# Patient Record
Sex: FEMALE | Race: OTHER | HISPANIC OR LATINO
[De-identification: names, ages, dates, MRNs, and addresses within clinical notes are randomized per-mention and may not be internally consistent; named-entity substitution may affect disease eponyms.]

---

## 2021-12-23 PROBLEM — Z00.00 ENCOUNTER FOR PREVENTIVE HEALTH EXAMINATION: Status: ACTIVE | Noted: 2021-12-23

## 2021-12-28 ENCOUNTER — APPOINTMENT (OUTPATIENT)
Dept: SURGERY | Facility: CLINIC | Age: 51
End: 2021-12-28
Payer: COMMERCIAL

## 2021-12-28 DIAGNOSIS — K63.89 OTHER SPECIFIED DISEASES OF INTESTINE: ICD-10-CM

## 2021-12-28 DIAGNOSIS — I10 ESSENTIAL (PRIMARY) HYPERTENSION: ICD-10-CM

## 2021-12-28 PROCEDURE — 99204 OFFICE O/P NEW MOD 45 MIN: CPT

## 2021-12-28 RX ORDER — METRONIDAZOLE 500 MG/1
500 TABLET ORAL
Qty: 6 | Refills: 0 | Status: ACTIVE | COMMUNITY
Start: 2021-12-28 | End: 1900-01-01

## 2021-12-28 RX ORDER — OXYCODONE 5 MG/1
5 TABLET ORAL
Qty: 20 | Refills: 0 | Status: ACTIVE | COMMUNITY
Start: 2021-09-15

## 2021-12-28 RX ORDER — AMLODIPINE BESYLATE AND BENAZEPRIL HYDROCHLORIDE 5; 40 MG/1; MG/1
5-40 CAPSULE ORAL
Qty: 90 | Refills: 0 | Status: ACTIVE | COMMUNITY
Start: 2021-04-23

## 2021-12-28 RX ORDER — SODIUM SULFATE, POTASSIUM SULFATE, MAGNESIUM SULFATE 17.5; 3.13; 1.6 G/ML; G/ML; G/ML
17.5-3.13-1.6 SOLUTION, CONCENTRATE ORAL
Qty: 354 | Refills: 0 | Status: ACTIVE | COMMUNITY
Start: 2021-10-05

## 2021-12-28 RX ORDER — NEOMYCIN SULFATE 500 MG/1
500 TABLET ORAL
Qty: 6 | Refills: 0 | Status: ACTIVE | COMMUNITY
Start: 2021-12-28 | End: 1900-01-01

## 2022-01-10 NOTE — PHYSICAL EXAM
[Abdomen Masses] : No abdominal masses [Abdomen Tenderness] : ~T No ~M abdominal tenderness [JVD] : no jugular venous distention  [Respiratory Effort] : normal respiratory effort [No Rash or Lesion] : No rash or lesion [Alert] : alert [Calm] : calm [de-identified] : No acute distress

## 2022-01-10 NOTE — HISTORY OF PRESENT ILLNESS
[FreeTextEntry1] : 51-year-old female here for initial evaluation referred by Dr. Bhandari for a newly diagnosed colonic mass on colonoscopy.  Patient had a positive Cologuard prior to colonoscopy.  A large friable mass in the ascending colon that was biopsied.  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  Patient has not yet had a CT abdomen pelvis to assess for any metastatic disease.\par \par She is otherwise quite healthy.  She has a history of hypertension and chronic constipation.

## 2022-01-10 NOTE — ASSESSMENT
[FreeTextEntry1] : 51-year-old female with a large ascending colon mass with at least high-grade dysplasia on biopsy.  The mass is not resectable via an endoscopic approach.  Significant risk for underlying cancer in such a large polypoid lesion.\par \par Discussed surgical treatment of unresectable polyps and colon cancer with patient.  We will get CT imaging to rule out any distant metastatic disease.  Discussed open laparoscopic and robotic approaches to colon resection.  We will plan for a robotic assisted right hemicolectomy with an oncologic resection given the high risk for underlying cancer.\par \par Risks and benefits discussed including risks of open surgery infection anastomotic leak bleeding as well as DVT PE.  Patient will schedule her CT scan and then plan for surgery.\par \par Bowel prep instructions provided as well this surgical pre and post information packet.\par \par Patient can have clearance with her primary care physician or the nurse practitioner in the presurgical testing suite.\par \par She will get Covid testing prior to the surgery.

## 2022-01-10 NOTE — DATA REVIEWED
[FreeTextEntry1] : Outside colonoscopy report and pathology reports reviewed.  Ascending colon mass approximate 6 to 7 cm in length, hemicircumferential.  Pathology consistent with high-grade dysplasia tubulovillous adenoma

## 2022-01-18 ENCOUNTER — RESULT REVIEW (OUTPATIENT)
Age: 52
End: 2022-01-18

## 2022-01-27 ENCOUNTER — RESULT REVIEW (OUTPATIENT)
Age: 52
End: 2022-01-27

## 2022-01-28 ENCOUNTER — RESULT REVIEW (OUTPATIENT)
Age: 52
End: 2022-01-28

## 2022-01-30 ENCOUNTER — RESULT REVIEW (OUTPATIENT)
Age: 52
End: 2022-01-30

## 2022-01-31 ENCOUNTER — APPOINTMENT (OUTPATIENT)
Dept: SURGERY | Facility: HOSPITAL | Age: 52
End: 2022-01-31

## 2022-02-10 ENCOUNTER — APPOINTMENT (OUTPATIENT)
Dept: SURGERY | Facility: CLINIC | Age: 52
End: 2022-02-10
Payer: COMMERCIAL

## 2022-02-10 VITALS
WEIGHT: 148 LBS | HEART RATE: 90 BPM | DIASTOLIC BLOOD PRESSURE: 74 MMHG | RESPIRATION RATE: 16 BRPM | HEIGHT: 67 IN | OXYGEN SATURATION: 99 % | SYSTOLIC BLOOD PRESSURE: 140 MMHG | BODY MASS INDEX: 23.23 KG/M2

## 2022-02-10 PROCEDURE — XXXXX: CPT | Mod: 1L

## 2022-02-14 LAB
ANION GAP SERPL CALC-SCNC: 12 MMOL/L
BASOPHILS # BLD AUTO: 0.02 K/UL
BASOPHILS NFR BLD AUTO: 0.2 %
BUN SERPL-MCNC: 12 MG/DL
CALCIUM SERPL-MCNC: 9.4 MG/DL
CEA SERPL-MCNC: <0.6 NG/ML
CHLORIDE SERPL-SCNC: 104 MMOL/L
CO2 SERPL-SCNC: 24 MMOL/L
CREAT SERPL-MCNC: 0.64 MG/DL
EOSINOPHIL # BLD AUTO: 0.04 K/UL
EOSINOPHIL NFR BLD AUTO: 0.4 %
GLUCOSE SERPL-MCNC: 86 MG/DL
HCT VFR BLD CALC: 39.1 %
HGB BLD-MCNC: 12.1 G/DL
IMM GRANULOCYTES NFR BLD AUTO: 0.3 %
LYMPHOCYTES # BLD AUTO: 2.24 K/UL
LYMPHOCYTES NFR BLD AUTO: 23.5 %
MAN DIFF?: NORMAL
MCHC RBC-ENTMCNC: 28.9 PG
MCHC RBC-ENTMCNC: 30.9 GM/DL
MCV RBC AUTO: 93.5 FL
MONOCYTES # BLD AUTO: 1.32 K/UL
MONOCYTES NFR BLD AUTO: 13.8 %
NEUTROPHILS # BLD AUTO: 5.9 K/UL
NEUTROPHILS NFR BLD AUTO: 61.8 %
PLATELET # BLD AUTO: 342 K/UL
POTASSIUM SERPL-SCNC: 4.4 MMOL/L
RBC # BLD: 4.18 M/UL
RBC # FLD: 13.4 %
SODIUM SERPL-SCNC: 140 MMOL/L
WBC # FLD AUTO: 9.55 K/UL

## 2022-02-17 NOTE — HISTORY OF PRESENT ILLNESS
[FreeTextEntry1] : 51-year-old female here for postop visit after robotic assisted right hemicolectomy.  Patient is doing well since discharge.  She is eating and drinking without issue.  She is moving her bowels regularly.  Incisional pain continues to heal and is not requiring narcotic pain medication at this time.\par \par She denies fever nausea vomiting redness around the incisions.  Pathology reviewed with the patient.  She had a stage I T1N0 colon cancer.  24 lymph nodes were negative for metastatic disease.

## 2022-02-17 NOTE — PHYSICAL EXAM
[Abdomen Masses] : No abdominal masses [Abdomen Tenderness] : ~T No ~M abdominal tenderness [JVD] : no jugular venous distention  [Respiratory Effort] : normal respiratory effort [Normal Rate and Rhythm] : normal rate and rhythm [No Rash or Lesion] : No rash or lesion [Alert] : alert [Calm] : calm [de-identified] : Incisions clean dry and intact healing well [de-identified] : No acute distress

## 2022-02-17 NOTE — ASSESSMENT
[FreeTextEntry1] : 51-year-old female with stage I colon cancer status post right hemicolectomy with robotic approach.\par \par Patient continues to heal well.  Can return to regular diet.  Can return to regular activities as tolerated.  She had previously noted some potential hemorrhoidal symptoms although she had a history of constipation prior to surgery.  She is currently moving her bowels regularly.  Can follow-up as needed in 6 to 8 weeks if she continues to have hemorrhoid symptoms.\par \par Discussed potential long-term follow-up for stage I colon cancer.  There is mixed data regarding aggressive follow-up of stage I colon cancer is with very low risk for any distant recurrence in the future.\par \par Recommend she see an oncologist at [facility] to discuss the role for further follow-up versus history and physical exam with her primary care physician.  She does need a colonoscopy at 1 year from surgery to assess for any new polyp growths in the remaining colon and the anastomosis.

## 2023-09-19 ENCOUNTER — APPOINTMENT (OUTPATIENT)
Dept: SURGERY | Facility: CLINIC | Age: 53
End: 2023-09-19
Payer: COMMERCIAL

## 2023-09-19 DIAGNOSIS — Z85.038 PERSONAL HISTORY OF OTHER MALIGNANT NEOPLASM OF LARGE INTESTINE: ICD-10-CM

## 2023-09-19 PROCEDURE — 99212 OFFICE O/P EST SF 10 MIN: CPT

## 2023-09-22 PROBLEM — Z85.038 HISTORY OF MALIGNANT NEOPLASM OF ASCENDING COLON: Status: RESOLVED | Noted: 2022-02-17 | Resolved: 2023-09-22

## 2023-11-02 ENCOUNTER — RESULT REVIEW (OUTPATIENT)
Age: 53
End: 2023-11-02

## 2023-11-03 ENCOUNTER — RESULT REVIEW (OUTPATIENT)
Age: 53
End: 2023-11-03

## 2023-11-03 ENCOUNTER — APPOINTMENT (OUTPATIENT)
Dept: SURGERY | Facility: HOSPITAL | Age: 53
End: 2023-11-03